# Patient Record
Sex: FEMALE | Race: WHITE | NOT HISPANIC OR LATINO | Employment: FULL TIME | URBAN - METROPOLITAN AREA
[De-identification: names, ages, dates, MRNs, and addresses within clinical notes are randomized per-mention and may not be internally consistent; named-entity substitution may affect disease eponyms.]

---

## 2017-01-04 ENCOUNTER — OFFICE VISIT (OUTPATIENT)
Dept: VASCULAR SURGERY | Facility: CLINIC | Age: 52
End: 2017-01-04
Payer: COMMERCIAL

## 2017-01-04 ENCOUNTER — PATIENT MESSAGE (OUTPATIENT)
Dept: VASCULAR SURGERY | Facility: CLINIC | Age: 52
End: 2017-01-04

## 2017-01-04 VITALS
HEART RATE: 78 BPM | HEIGHT: 68 IN | WEIGHT: 112 LBS | SYSTOLIC BLOOD PRESSURE: 132 MMHG | BODY MASS INDEX: 16.97 KG/M2 | DIASTOLIC BLOOD PRESSURE: 78 MMHG

## 2017-01-04 DIAGNOSIS — I83.811 VARICOSE VEINS OF RIGHT LOWER EXTREMITY WITH PAIN: Primary | ICD-10-CM

## 2017-01-04 PROCEDURE — 99213 OFFICE O/P EST LOW 20 MIN: CPT | Mod: S$GLB,,, | Performed by: THORACIC SURGERY (CARDIOTHORACIC VASCULAR SURGERY)

## 2017-01-04 PROCEDURE — 1159F MED LIST DOCD IN RCRD: CPT | Mod: S$GLB,,, | Performed by: THORACIC SURGERY (CARDIOTHORACIC VASCULAR SURGERY)

## 2017-01-04 PROCEDURE — 99999 PR PBB SHADOW E&M-EST. PATIENT-LVL III: CPT | Mod: PBBFAC,,, | Performed by: THORACIC SURGERY (CARDIOTHORACIC VASCULAR SURGERY)

## 2017-01-04 RX ORDER — DEXTROAMPHETAMINE SACCHARATE, AMPHETAMINE ASPARTATE MONOHYDRATE, DEXTROAMPHETAMINE SULFATE AND AMPHETAMINE SULFATE 7.5; 7.5; 7.5; 7.5 MG/1; MG/1; MG/1; MG/1
CAPSULE, EXTENDED RELEASE ORAL
Refills: 0 | COMMUNITY
Start: 2016-12-27 | End: 2021-11-01

## 2017-01-04 NOTE — MR AVS SNAPSHOT
"    [facility]  [address] [address]  [place] [address]  Phone: [phone]                  Renetta Burks   2017 3:00 PM   Office Visit    Description:  Female : 1965   Provider:  Rebekah De La Rosa MD   Department:  [facility]           Reason for Visit     Follow-up           Diagnoses this Visit        Comments    Varicose veins of right lower extremity with pain    -  Primary            To Do List           Goals (5 Years of Data)     None      [facility]     [facility] On Call Nurse Care Line -  Assistance  Registered nurses in the [facility] provide clinical advisement, health education, appointment booking, and other advisory services.  Call for this free service at [phone].             Medications           Message regarding Medications     Verify the changes and/or additions to your medication regime listed below are the same as discussed with your clinician today.  If any of these changes or additions are incorrect, please notify your healthcare provider.             Verify that the below list of medications is an accurate representation of the medications you are currently taking.  If none reported, the list may be blank. If incorrect, please contact your healthcare provider. Carry this list with you in case of emergency.           Current Medications     dextroamphetamine-amphetamine (ADDERALL XR) 30 MG 24 hr capsule TK 1 C PO QD           Clinical Reference Information           Vital Signs - Last Recorded  Most recent update: 2017  3:29 PM by Roxie Shepherd LPN    BP Pulse Ht Wt BMI    132/78 78 5' 8" (1.727 m) 50.8 kg (112 lb) 17.03 kg/m2      Blood Pressure          Most Recent Value    BP  132/78      Allergies as of 2017     No Known Allergies      Immunizations Administered on Date of Encounter - 2017     None      "

## 2017-01-04 NOTE — PROGRESS NOTES
OFFICE VISIT NOTE    Ms. Burks underwent Veinlite-guided sclerotherapy of the right lower extremity   on 11/30/2016.  She is still having some tenderness in the veins that were   treated.  She has some hyperpigmentation and is concerned about that.    PHYSICAL EXAMINATION:  There is some hyperpigmentation.  There is induration in   some of the veins that were treated.  There are still some veins that are not   closed off.  Feet are pink, warm with good capillary refill.    I reassured her about the tenderness.  The induration is because of thrombus   within the veins that were treated.  The tenderness is also because of that.  We   discussed again the hyperpigmentation, and she does remember me explaining to   her that was one of the risks.  We also talked about matting.  I do not see any   evidence of matting at this point.  I think we need to give this some more time.    If she is still not feeling well and there is still hyperpigmentation, she   will call us in a few months.  We talked for anywhere from about three to six   months.  We will then have her come in and then further recommendations would   follow.  If the leg is feeling fine, then no further sclerotherapy would be   done, but if there are still problem with pain over varicose veins that have not   closed off, then those would require further sclerotherapy.  With respect to   the hyperpigmentation that can take several months and up to a year to go away   and very infrequently there are some patients in whom it does not go away.  We   could consider a bleaching cream, but it does not work very well.  Again, she   will make a decision as to whether she wants to be seen again in a few months.    I offered to make the appointment now, but she wants to wait and make that   decision on her own since it could save her an appointment and not have to come   in if all is well at that time.      KELLIE  dd: 01/04/2017 17:30:16 (CST)  td: 01/05/2017 02:44:38  ([facility])  Doc ID   #[id number]  Job ID #[id number]    CC:

## 2021-06-24 PROBLEM — F90.0 ADHD, PREDOMINANTLY INATTENTIVE TYPE: Status: ACTIVE | Noted: 2021-06-24

## 2021-08-25 ENCOUNTER — OFFICE VISIT (OUTPATIENT)
Dept: URGENT CARE | Facility: CLINIC | Age: 56
End: 2021-08-25
Payer: COMMERCIAL

## 2021-08-25 VITALS
WEIGHT: 118 LBS | OXYGEN SATURATION: 97 % | HEART RATE: 63 BPM | TEMPERATURE: 98 F | HEIGHT: 69 IN | SYSTOLIC BLOOD PRESSURE: 99 MMHG | RESPIRATION RATE: 16 BRPM | BODY MASS INDEX: 17.48 KG/M2 | DIASTOLIC BLOOD PRESSURE: 61 MMHG

## 2021-08-25 DIAGNOSIS — Z20.822 COVID-19 RULED OUT: Primary | ICD-10-CM

## 2021-08-25 LAB
CTP QC/QA: YES
SARS-COV-2 RDRP RESP QL NAA+PROBE: NEGATIVE

## 2021-08-25 PROCEDURE — 99202 OFFICE O/P NEW SF 15 MIN: CPT | Mod: S$GLB,,, | Performed by: FAMILY MEDICINE

## 2021-08-25 PROCEDURE — 1159F MED LIST DOCD IN RCRD: CPT | Mod: CPTII,S$GLB,, | Performed by: FAMILY MEDICINE

## 2021-08-25 PROCEDURE — 3074F PR MOST RECENT SYSTOLIC BLOOD PRESSURE < 130 MM HG: ICD-10-PCS | Mod: CPTII,S$GLB,, | Performed by: FAMILY MEDICINE

## 2021-08-25 PROCEDURE — 1160F PR REVIEW ALL MEDS BY PRESCRIBER/CLIN PHARMACIST DOCUMENTED: ICD-10-PCS | Mod: CPTII,S$GLB,, | Performed by: FAMILY MEDICINE

## 2021-08-25 PROCEDURE — 3074F SYST BP LT 130 MM HG: CPT | Mod: CPTII,S$GLB,, | Performed by: FAMILY MEDICINE

## 2021-08-25 PROCEDURE — 1159F PR MEDICATION LIST DOCUMENTED IN MEDICAL RECORD: ICD-10-PCS | Mod: CPTII,S$GLB,, | Performed by: FAMILY MEDICINE

## 2021-08-25 PROCEDURE — 3008F BODY MASS INDEX DOCD: CPT | Mod: CPTII,S$GLB,, | Performed by: FAMILY MEDICINE

## 2021-08-25 PROCEDURE — 3078F PR MOST RECENT DIASTOLIC BLOOD PRESSURE < 80 MM HG: ICD-10-PCS | Mod: CPTII,S$GLB,, | Performed by: FAMILY MEDICINE

## 2021-08-25 PROCEDURE — 3078F DIAST BP <80 MM HG: CPT | Mod: CPTII,S$GLB,, | Performed by: FAMILY MEDICINE

## 2021-08-25 PROCEDURE — 99202 PR OFFICE/OUTPT VISIT, NEW, LEVL II, 15-29 MIN: ICD-10-PCS | Mod: S$GLB,,, | Performed by: FAMILY MEDICINE

## 2021-08-25 PROCEDURE — 3008F PR BODY MASS INDEX (BMI) DOCUMENTED: ICD-10-PCS | Mod: CPTII,S$GLB,, | Performed by: FAMILY MEDICINE

## 2021-08-25 PROCEDURE — 1160F RVW MEDS BY RX/DR IN RCRD: CPT | Mod: CPTII,S$GLB,, | Performed by: FAMILY MEDICINE

## 2021-08-25 PROCEDURE — U0002: ICD-10-PCS | Mod: QW,S$GLB,, | Performed by: FAMILY MEDICINE

## 2021-08-25 PROCEDURE — U0002 COVID-19 LAB TEST NON-CDC: HCPCS | Mod: QW,S$GLB,, | Performed by: FAMILY MEDICINE

## 2022-02-01 ENCOUNTER — OFFICE VISIT (OUTPATIENT)
Dept: URGENT CARE | Facility: CLINIC | Age: 57
End: 2022-02-01
Payer: COMMERCIAL

## 2022-02-01 VITALS
SYSTOLIC BLOOD PRESSURE: 120 MMHG | WEIGHT: 118 LBS | TEMPERATURE: 97 F | DIASTOLIC BLOOD PRESSURE: 68 MMHG | HEIGHT: 70 IN | RESPIRATION RATE: 16 BRPM | HEART RATE: 61 BPM | OXYGEN SATURATION: 98 % | BODY MASS INDEX: 16.89 KG/M2

## 2022-02-01 DIAGNOSIS — S16.1XXA STRAIN OF NECK MUSCLE, INITIAL ENCOUNTER: ICD-10-CM

## 2022-02-01 DIAGNOSIS — M54.2 NECK PAIN: ICD-10-CM

## 2022-02-01 DIAGNOSIS — M25.511 ACUTE PAIN OF RIGHT SHOULDER: Primary | ICD-10-CM

## 2022-02-01 DIAGNOSIS — S40.011A CONTUSION OF RIGHT SHOULDER, INITIAL ENCOUNTER: ICD-10-CM

## 2022-02-01 PROCEDURE — 99213 OFFICE O/P EST LOW 20 MIN: CPT | Mod: S$GLB,,, | Performed by: NURSE PRACTITIONER

## 2022-02-01 PROCEDURE — 99213 PR OFFICE/OUTPT VISIT, EST, LEVL III, 20-29 MIN: ICD-10-PCS | Mod: S$GLB,,, | Performed by: NURSE PRACTITIONER

## 2022-02-01 RX ORDER — TIZANIDINE 4 MG/1
4 TABLET ORAL EVERY 6 HOURS PRN
Qty: 30 TABLET | Refills: 0 | Status: SHIPPED | OUTPATIENT
Start: 2022-02-01 | End: 2022-02-11
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
## 2022-02-01 NOTE — PATIENT INSTRUCTIONS
"Continue meloxicam for inflammation  Limited work duty  Activity as tolerated.    Patient Education       Shoulder Pain Discharge Instructions   About this topic   Your shoulder joint is made of 3 bones. These are the upper arm bone, the shoulder blade, and the collarbone. The shoulder is a "ball and socket" joint. The "ball" part of the joint is the top part of your upper arm bone. The "socket" part of your joint is a cup shaped indentation in your shoulder blade. Because of this, the shoulder can move in many ways. Strong bands of tissue called ligaments help hold the shoulder in place. Muscles and tendons also hold it in place.  You can have pain in your shoulder for many reasons. It may be hard for the doctor to tell exactly where the pain is coming from. You can have pain in your muscles, bones, or joints. It can also happen in your tendons and ligaments which connect these together.  Causes of this kind of pain may include:  · Overuse or using muscles in the same way over and over  · Trauma from falls, accidents, direct blows to muscles, and injuries such as bone breaks, sprains, or dislocations  · Strain on your muscles from bad posture           What care is needed at home?   · Ask your doctor what you need to do when you go home. Make sure you ask questions if you do not understand what the doctor says. This way you will know what you need to do.  · Rest. Allow your injury to heal before you do slow movements.  · Place an ice pack or a bag of frozen peas wrapped in a towel over the painful part. Never put ice right on the skin. Do not leave the ice on more than 10 to 15 minutes at a time.  · Prop your arm on pillows to help with swelling.  · Your doctor may want you to use a sling, strap, or sleeve to keep your shoulder from moving.  · Heat may be used but not right after an injury. Heat can make swelling worse. If your doctor tells you to use heat, put a heating pad on your shoulder for no more than 20 " minutes at a time. Never go to sleep with a heating pad on as this can cause burns.  · Do range of motion exercises as your therapist or doctor teaches you to do. As your shoulder heals, you will be given more exercises to stretch and strengthen your shoulder.  What follow-up care is needed?   · Your doctor may ask you to make visits to the office to check on your progress. Be sure to keep all these visits.  · Your doctor may send you to physical therapy or occupational therapy to help you regain use of your shoulder sooner.  What drugs may be needed?   The doctor may order drugs to:  · Help with pain and swelling  The doctor may give you a shot of an anti-inflammatory drug called a corticosteroid. This will help with swelling. Talk with your doctor about the risks of this shot.  Will physical activity be limited?   Your doctor may ask you to rest and limit your activity. Based on how bad your shoulder injury is, this could last for a few days to a number of weeks.  What can be done to prevent this health problem?   · Stay active and work out to keep your muscles strong and flexible.  · Warm up slowly and stretch your muscles before you work out. Do not work out if you are overly tired. Take extra care if working out in cold weather.  · Slowly increase the amount of time you work out. If you are using weights, slowly increase the weight to strengthen your muscles.  · Wear protection when playing sports.  · Take breaks often when doing things that use repeat movements.  When do I need to call the doctor?   · Pain or swelling gets worse  · Hand feels cold or numb  · You are not feeling better in 2 or 3 days or you are feeling worse  Teach Back: Helping You Understand   The Teach Back Method helps you understand the information we are giving you. After you talk with the staff, tell them in your own words what you learned. This helps to make sure the staff has described each thing clearly. It also helps to explain  things that may have been confusing. Before going home, make sure you can do these:  · I can tell you about my condition.  · I can tell you what may help ease my pain.  · I can tell you what I will do if I have more pain or swelling or my fingers are cool or blue.  Where can I learn more?   American Academy [employer]  http://orthoinfo.aaos.org/PDFs/D79656.pdf   Last Reviewed Date   2020-09-25  Consumer Information Use and Disclaimer   This information is not specific medical advice and does not replace information you receive from your health care provider. This is only a brief summary of general information. It does NOT include all information about conditions, illnesses, injuries, tests, procedures, treatments, therapies, discharge instructions or life-style choices that may apply to you. You must talk with your health care provider for complete information about your health and treatment options. This information should not be used to decide whether or not to accept your health care providers advice, instructions or recommendations. Only your health care provider has the knowledge and training to provide advice that is right for you.  Copyright   Copyright © 2021 UpToDate, Inc. and its affiliates and/or licensors. All rights reserved.  Patient Education       Generalized Neck Pain Discharge Instructions   About this topic   Our neck has many parts including bones, muscles, tendons, ligaments, and nerves. Vertebrae, the bones in your spine, start at the base of your skull and extend down the back of your neck. There are discs between the vertebrae to cushion the bones. Ligaments, muscles, and tendons help hold your spine in place and let you move your neck. Your spinal cord starts at the base of your brain and extends down your back. It is protected by your vertebrae. Smaller nerves travel from your spinal cord to your muscles and skin.  Most neck pain is caused by an injury to a ligament, tendon,  muscle, or nerve.       What care is needed at home?   · Ask your doctor what you need to do when you go home. Make sure you ask questions if you do not understand what the doctor says.  · Wear your neck brace or cushion as you were told to. If the doctor told you to, you may start doing gentle neck stretches in a few days.  · For recent strains, place an ice pack or a bag of frozen vegetables wrapped in a towel over the painful part. Never put ice right on the skin. Use ice every 1 to 2 hours for 10 to 15 minutes at a time. Use for the first 24 to 48 hours after your injury.  · Use heat after the first 24 to 48 hours, but not right away. Put a heating pad on the painful part for no more than 20 minutes at a time. Never go to sleep with a heating pad on as this can cause burns. You can also take a hot shower or bath.  · You may want to take medicines like ibuprofen or naproxen for swelling and pain. These are nonsteroidal anti-inflammatory drugs (NSAIDs).  · Try to practice good posture to avoid putting strain on your neck. Sit up straight and keep your shoulders back. It can also help to avoid sitting in the same position for too long and to avoid putting pressure on your upper back by carrying heavy things. When you sleep, try to keep your neck in line with the rest of your body.  What follow-up care is needed?   Your doctor may ask you to make visits to the office to check on your progress. Be sure to keep these visits.  What drugs may be needed?   The doctor may order drugs to:  · Help with pain and swelling  · Relax muscles  · Fight an infection  The doctor may give you a shot of an anti-inflammatory drug called a corticosteroid. This will help with swelling. Talk with your doctor about the risks of this shot.  Will physical activity be limited?   You may need to rest for a while. You should not do physical activity that makes your health problem worse. Talk to your doctor if you run, work out, or play sports.  You may not be able to do those things until your health problem gets better.  What problems could happen?   · Infection  · Bleeding  · Injury to nerves, tendons, or blood vessels  · Ongoing pain  · Blood clots  · Numbness, tingling, or weakness in the arms or legs  · Arthritis  · Loss of bladder or bowel control  · Paralysis  What can be done to prevent this health problem?   · Always wear a seat belt. Drive safely. Obey speed limits. Do not drink and drive.  · Have headrests in the car at the right height. The middle of the headrest should be even with the upper parts of your ears.  · Take breaks often when doing things that use repeat movements.  · If you have a desk job, make sure your computer is at eye level and that you have a supportive chair. Read papers at eye level.  · If you use the telephone often for your job, use a headset if possible. Do not hold the phone between your ear and shoulder.  · Stay active and work out to keep your muscles strong and flexible.  · Warm up slowly and stretch before you work out. Use good ways to train, such as slowly adding to how far you run. Do not work out if you are overly tired. Take extra care if working out in cold weather.  · Wear the right equipment when playing sports.  · Always wear helmets for bikes and motorcycles.  When do I need to call the doctor?   · Your neck becomes stiff and hard to move and you are feeling sick or develop a fever or chills.  · You have new weakness in one of your arms.  · You have bad pain that is not helped by pain medicine.  · Your hand or arm is swollen.  · Your arm is numb or tingly.  Teach Back: Helping You Understand   The Teach Back Method helps you understand the information we are giving you. After you talk with the staff, tell them in your own words what you learned. This helps to make sure the staff has described each thing clearly. It also helps to explain things that may have been confusing. Before going home, make sure you  can do these:  · I can tell you about my pain.  · I can tell you what may help ease my pain.  · I can tell you what I will do if I have fever, chills, nausea, vomiting, light sensitivity, or a very bad headache and neck stiffness.  Where can I learn more?   [employer]  https://www.betterhealth.keyur.gov.au/health/ConditionsAndTreatments/neck-pain   NHS Choices  https://www.nhs.uk/conditions/neck-pain-and-stiff-neck/   Last Reviewed Date   2021-09-01  Consumer Information Use and Disclaimer   This information is not specific medical advice and does not replace information you receive from your health care provider. This is only a brief summary of general information. It does NOT include all information about conditions, illnesses, injuries, tests, procedures, treatments, therapies, discharge instructions or life-style choices that may apply to you. You must talk with your health care provider for complete information about your health and treatment options. This information should not be used to decide whether or not to accept your health care providers advice, instructions or recommendations. Only your health care provider has the knowledge and training to provide advice that is right for you.  Copyright   Copyright © 2021 UpToDate, Inc. and its affiliates and/or licensors. All rights reserved.

## 2022-02-07 ENCOUNTER — OFFICE VISIT (OUTPATIENT)
Dept: URGENT CARE | Facility: CLINIC | Age: 57
End: 2022-02-07
Payer: COMMERCIAL

## 2022-02-07 VITALS
HEIGHT: 70 IN | TEMPERATURE: 97 F | SYSTOLIC BLOOD PRESSURE: 97 MMHG | OXYGEN SATURATION: 98 % | HEART RATE: 60 BPM | DIASTOLIC BLOOD PRESSURE: 63 MMHG | BODY MASS INDEX: 16.89 KG/M2 | RESPIRATION RATE: 16 BRPM | WEIGHT: 118 LBS

## 2022-02-07 DIAGNOSIS — S49.91XD INJURY OF RIGHT SHOULDER, SUBSEQUENT ENCOUNTER: Primary | ICD-10-CM

## 2022-02-07 PROCEDURE — 99213 OFFICE O/P EST LOW 20 MIN: CPT | Mod: S$GLB,,, | Performed by: NURSE PRACTITIONER

## 2022-02-07 PROCEDURE — 73030 XR SHOULDER TRAUMA 3 VIEW RIGHT: ICD-10-PCS | Mod: RT,S$GLB,, | Performed by: RADIOLOGY

## 2022-02-07 PROCEDURE — 73030 X-RAY EXAM OF SHOULDER: CPT | Mod: RT,S$GLB,, | Performed by: RADIOLOGY

## 2022-02-07 PROCEDURE — 99213 PR OFFICE/OUTPT VISIT, EST, LEVL III, 20-29 MIN: ICD-10-PCS | Mod: S$GLB,,, | Performed by: NURSE PRACTITIONER
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
## 2022-05-10 PROBLEM — R63.6 UNDERWEIGHT: Status: ACTIVE | Noted: 2022-05-10

## 2022-05-10 PROBLEM — R92.30 DENSE BREAST TISSUE: Status: ACTIVE | Noted: 2018-07-31

## 2022-05-10 PROBLEM — N95.1 MENOPAUSAL FLUSHING: Status: ACTIVE | Noted: 2020-07-27

## 2022-05-10 PROBLEM — Z80.3 FAMILY HISTORY OF BREAST CANCER: Status: ACTIVE | Noted: 2017-06-12

## 2022-05-10 PROBLEM — Z80.41 FAMILY HISTORY OF MALIGNANT NEOPLASM OF OVARY: Status: ACTIVE | Noted: 2017-06-09

## 2022-05-10 PROBLEM — F90.9 ATTENTION DEFICIT HYPERACTIVITY DISORDER: Status: ACTIVE | Noted: 2018-06-18

## 2022-05-10 PROBLEM — S46.011A TRAUMATIC TEAR OF RIGHT ROTATOR CUFF: Status: ACTIVE | Noted: 2022-05-10

## 2023-09-17 ENCOUNTER — TELEPHONE (OUTPATIENT)
Dept: PAIN MEDICINE | Facility: CLINIC | Age: 58
End: 2023-09-17
Payer: COMMERCIAL

## 2023-09-17 NOTE — TELEPHONE ENCOUNTER
----- Message from Sharifakarena Chavisearnestine sent at 9/12/2023  8:23 AM CDT -----  Contact: Self  Type: Needs Medical Advice  Who Called:  Pt   Symptoms (please be specific):  Has questions regarding her appointment and injections.     Best Call Back Number: [phone]    Additional Information: Please call and if she doesn't answer leave her voicemail and a good number and time to call back. She gets out of work at 3:30.